# Patient Record
Sex: MALE | Employment: FULL TIME | URBAN - METROPOLITAN AREA
[De-identification: names, ages, dates, MRNs, and addresses within clinical notes are randomized per-mention and may not be internally consistent; named-entity substitution may affect disease eponyms.]

---

## 2020-06-13 DIAGNOSIS — Z20.822 SUSPECTED COVID-19 VIRUS INFECTION: ICD-10-CM

## 2022-09-20 ENCOUNTER — HOSPITAL ENCOUNTER (EMERGENCY)
Facility: HOSPITAL | Age: 37
Discharge: HOME | End: 2022-09-20
Attending: EMERGENCY MEDICINE

## 2022-09-20 VITALS
HEIGHT: 70 IN | RESPIRATION RATE: 18 BRPM | SYSTOLIC BLOOD PRESSURE: 144 MMHG | WEIGHT: 177 LBS | HEART RATE: 71 BPM | BODY MASS INDEX: 25.34 KG/M2 | OXYGEN SATURATION: 98 % | DIASTOLIC BLOOD PRESSURE: 78 MMHG | TEMPERATURE: 99 F

## 2022-09-20 DIAGNOSIS — J36 PERITONSILLAR ABSCESS: Primary | ICD-10-CM

## 2022-09-20 LAB
ALBUMIN SERPL-MCNC: 4.4 GM/DL (ref 3.5–5)
ALBUMIN/GLOB SERPL: 1.1 RATIO (ref 1.1–2)
ALP SERPL-CCNC: 88 UNIT/L (ref 40–150)
ALT SERPL-CCNC: 22 UNIT/L (ref 0–55)
AST SERPL-CCNC: 21 UNIT/L (ref 5–34)
BASOPHILS # BLD AUTO: 0.08 X10(3)/MCL (ref 0–0.2)
BASOPHILS NFR BLD AUTO: 0.7 %
BILIRUBIN DIRECT+TOT PNL SERPL-MCNC: 0.5 MG/DL
BUN SERPL-MCNC: 15.3 MG/DL (ref 8.9–20.6)
CALCIUM SERPL-MCNC: 9.7 MG/DL (ref 8.4–10.2)
CHLORIDE SERPL-SCNC: 105 MMOL/L (ref 98–107)
CO2 SERPL-SCNC: 27 MMOL/L (ref 22–29)
CREAT SERPL-MCNC: 0.94 MG/DL (ref 0.73–1.18)
EOSINOPHIL # BLD AUTO: 0.21 X10(3)/MCL (ref 0–0.9)
EOSINOPHIL NFR BLD AUTO: 1.8 %
ERYTHROCYTE [DISTWIDTH] IN BLOOD BY AUTOMATED COUNT: 12.9 % (ref 11.5–17)
GFR SERPLBLD CREATININE-BSD FMLA CKD-EPI: >60 MLS/MIN/1.73/M2
GLOBULIN SER-MCNC: 3.9 GM/DL (ref 2.4–3.5)
GLUCOSE SERPL-MCNC: 97 MG/DL (ref 74–100)
HCT VFR BLD AUTO: 44.2 % (ref 42–52)
HGB BLD-MCNC: 14.7 GM/DL (ref 14–18)
IMM GRANULOCYTES # BLD AUTO: 0.03 X10(3)/MCL (ref 0–0.04)
IMM GRANULOCYTES NFR BLD AUTO: 0.3 %
LYMPHOCYTES # BLD AUTO: 3.58 X10(3)/MCL (ref 0.6–4.6)
LYMPHOCYTES NFR BLD AUTO: 30 %
MCH RBC QN AUTO: 29.1 PG (ref 27–31)
MCHC RBC AUTO-ENTMCNC: 33.3 MG/DL (ref 33–36)
MCV RBC AUTO: 87.4 FL (ref 80–94)
MONOCYTES # BLD AUTO: 0.97 X10(3)/MCL (ref 0.1–1.3)
MONOCYTES NFR BLD AUTO: 8.1 %
NEUTROPHILS # BLD AUTO: 7.1 X10(3)/MCL (ref 2.1–9.2)
NEUTROPHILS NFR BLD AUTO: 59.1 %
NRBC BLD AUTO-RTO: 0 %
PLATELET # BLD AUTO: 331 X10(3)/MCL (ref 130–400)
PMV BLD AUTO: 8.3 FL (ref 7.4–10.4)
POTASSIUM SERPL-SCNC: 3.6 MMOL/L (ref 3.5–5.1)
PROT SERPL-MCNC: 8.3 GM/DL (ref 6.4–8.3)
RBC # BLD AUTO: 5.06 X10(6)/MCL (ref 4.7–6.1)
SODIUM SERPL-SCNC: 140 MMOL/L (ref 136–145)
STREP A PCR (OHS): NOT DETECTED
WBC # SPEC AUTO: 12 X10(3)/MCL (ref 4.5–11.5)

## 2022-09-20 PROCEDURE — 87631 RESP VIRUS 3-5 TARGETS: CPT | Performed by: PHYSICIAN ASSISTANT

## 2022-09-20 PROCEDURE — 96365 THER/PROPH/DIAG IV INF INIT: CPT

## 2022-09-20 PROCEDURE — 85025 COMPLETE CBC W/AUTO DIFF WBC: CPT | Performed by: PHYSICIAN ASSISTANT

## 2022-09-20 PROCEDURE — 99285 EMERGENCY DEPT VISIT HI MDM: CPT | Mod: 25

## 2022-09-20 PROCEDURE — 96375 TX/PRO/DX INJ NEW DRUG ADDON: CPT

## 2022-09-20 PROCEDURE — 36415 COLL VENOUS BLD VENIPUNCTURE: CPT | Performed by: PHYSICIAN ASSISTANT

## 2022-09-20 PROCEDURE — 63600175 PHARM REV CODE 636 W HCPCS: Performed by: PHYSICIAN ASSISTANT

## 2022-09-20 PROCEDURE — 80053 COMPREHEN METABOLIC PANEL: CPT | Performed by: PHYSICIAN ASSISTANT

## 2022-09-20 PROCEDURE — 25000003 PHARM REV CODE 250: Performed by: EMERGENCY MEDICINE

## 2022-09-20 PROCEDURE — 63600175 PHARM REV CODE 636 W HCPCS: Performed by: EMERGENCY MEDICINE

## 2022-09-20 PROCEDURE — 25500020 PHARM REV CODE 255: Performed by: EMERGENCY MEDICINE

## 2022-09-20 RX ORDER — DEXAMETHASONE SODIUM PHOSPHATE 4 MG/ML
10 INJECTION, SOLUTION INTRA-ARTICULAR; INTRALESIONAL; INTRAMUSCULAR; INTRAVENOUS; SOFT TISSUE
Status: COMPLETED | OUTPATIENT
Start: 2022-09-20 | End: 2022-09-20

## 2022-09-20 RX ORDER — DEXAMETHASONE 4 MG/1
4 TABLET ORAL EVERY 12 HOURS
Qty: 6 TABLET | Refills: 0 | Status: SHIPPED | OUTPATIENT
Start: 2022-09-20 | End: 2022-09-20 | Stop reason: ALTCHOICE

## 2022-09-20 RX ORDER — CLINDAMYCIN HYDROCHLORIDE 150 MG/1
450 CAPSULE ORAL EVERY 8 HOURS
Qty: 63 CAPSULE | Refills: 0 | Status: SHIPPED | OUTPATIENT
Start: 2022-09-20 | End: 2022-09-20 | Stop reason: SDUPTHER

## 2022-09-20 RX ORDER — KETOROLAC TROMETHAMINE 30 MG/ML
15 INJECTION, SOLUTION INTRAMUSCULAR; INTRAVENOUS
Status: COMPLETED | OUTPATIENT
Start: 2022-09-20 | End: 2022-09-20

## 2022-09-20 RX ORDER — CLINDAMYCIN HYDROCHLORIDE 150 MG/1
450 CAPSULE ORAL EVERY 8 HOURS
Qty: 63 CAPSULE | Refills: 0 | Status: SHIPPED | OUTPATIENT
Start: 2022-09-20 | End: 2022-09-27

## 2022-09-20 RX ADMIN — KETOROLAC TROMETHAMINE 15 MG: 30 INJECTION, SOLUTION INTRAMUSCULAR; INTRAVENOUS at 12:09

## 2022-09-20 RX ADMIN — DEXAMETHASONE SODIUM PHOSPHATE 10 MG: 4 INJECTION, SOLUTION INTRA-ARTICULAR; INTRALESIONAL; INTRAMUSCULAR; INTRAVENOUS; SOFT TISSUE at 01:09

## 2022-09-20 RX ADMIN — AMPICILLIN SODIUM AND SULBACTAM SODIUM 3 G: 2; 1 INJECTION, POWDER, FOR SOLUTION INTRAMUSCULAR; INTRAVENOUS at 03:09

## 2022-09-20 RX ADMIN — IOPAMIDOL 100 ML: 755 INJECTION, SOLUTION INTRAVENOUS at 01:09

## 2022-09-20 NOTE — ED PROVIDER NOTES
Encounter Date: 9/20/2022    SCRIBE #1 NOTE: I, Amber Whitney, am scribing for, and in the presence of,  Sandra Smith MD. I have scribed the following portions of the note - Other sections scribed: HPI, PE,ROS.     History     Chief Complaint   Patient presents with    Oral Swelling     Pt was brought in by helicopter for eval of throat pain and tonsil swelling. States has been on amoxicillin since 9/18. Difficulty speaking, needs eval for possible retropharyngeal abscess. Denies fever or other symptoms.     37 year old male presents to ED, via [place] complaining of throat pain.  Pt repots that he has been on amoxicillin for the last 4 days, but is still having throat pain and trouble swallowing.  Pt denies any other complaints.      Sore Throat   This is a new problem. The sore throat symptoms include difficulty swallowing and sore throat.The current episode started several days ago. The problem has been unchanged. Associated symptoms include trouble swallowing. Pertinent negatives include no abdominal pain, coughing, diarrhea, headaches, shortness of breath or vomiting.   Review of patient's allergies indicates:  No Known Allergies  History reviewed. No pertinent past medical history.  History reviewed. No pertinent surgical history.  History reviewed. No pertinent family history.     Review of Systems   Constitutional:  Negative for chills, diaphoresis and fever.   HENT:  Positive for sore throat and trouble swallowing.    Eyes:  Negative for visual disturbance.   Respiratory:  Negative for cough and shortness of breath.    Cardiovascular:  Negative for chest pain and palpitations.   Gastrointestinal:  Negative for abdominal pain, constipation, diarrhea, nausea and vomiting.   Genitourinary:  Negative for dysuria and hematuria.   Skin:  Negative for rash.   Neurological:  Negative for weakness, numbness and headaches.   All other systems reviewed and are negative.    Physical Exam     Initial  Vitals [09/20/22 1139]   BP Pulse Resp Temp SpO2   (!) 168/108 71 20 98.6 °F (37 °C) 96 %      MAP       --         Physical Exam    Nursing note and vitals reviewed.  Constitutional: No distress.   HENT:   Head: Normocephalic and atraumatic.   Mouth/Throat: Posterior oropharyngeal erythema present.   Hoarse voice   Eyes: Conjunctivae are normal.   Neck: Neck supple. No tracheal deviation present.   Submandibular tenderness, R > L   Normal range of motion.  Cardiovascular:  Normal rate and regular rhythm.           No murmur heard.  Pulmonary/Chest: Breath sounds normal. No stridor. No respiratory distress. He exhibits no tenderness.   Abdominal: Abdomen is soft. Bowel sounds are normal. He exhibits no distension. There is no abdominal tenderness.   Musculoskeletal:         General: Normal range of motion.      Cervical back: Normal range of motion and neck supple.      Lumbar back: Normal. No tenderness. Normal range of motion.     Neurological: He is alert and oriented to person, place, and time. He has normal strength. No cranial nerve deficit or sensory deficit.   Skin: Skin is warm and dry.       ED Course   Procedures  Labs Reviewed   COMPREHENSIVE METABOLIC PANEL - Abnormal; Notable for the following components:       Result Value    Globulin 3.9 (*)     All other components within normal limits   CBC WITH DIFFERENTIAL - Abnormal; Notable for the following components:    WBC 12.0 (*)     All other components within normal limits   STREP GROUP A BY PCR - Normal   CBC W/ AUTO DIFFERENTIAL    Narrative:     The following orders were created for panel order CBC auto differential.  Procedure                               Abnormality         Status                     ---------                               -----------         ------                     CBC with Differential[[id number]]        Abnormal            Final result                 Please view results for these tests on the individual orders.           Imaging Results              CT Soft Tissue Neck With Contrast (Final result)  Result time 09/20/22 13:42:49      Final result by Alejandro Nye MD (09/20/22 13:42:49)                   Impression:      Findings consistent with peritonsillar abscess on the left side with tonsillitis seen bilaterally    These findings were relayed to Nilsa PICKERING at the time of dictation      Electronically signed by: Alejandro Nye  Date:    09/20/2022  Time:    13:42               Narrative:    EXAMINATION:  CT SOFT TISSUE NECK WITH CONTRAST    CLINICAL HISTORY:  Neck abscess, deep tissue;    TECHNIQUE:  Low dose axial images as well as sagittal and coronal reconstructions were performed from the skull base to the clavicles following the intravenous administration of 100 mL of Isovue 370.  Automatic exposure control (AEC) is utilized to reduce patient radiation exposure.    COMPARISON:  None    FINDINGS:  Visualized portions of the orbits appear normal.  Paranasal sinuses appear normal.  The parotid glands appear normal.  Submandibular glands appear normal.  There is a peritonsillar abscess seen on the left side.  It measures 1 cm x 1.2 x 1 cm.  Findings are consistent with a peritonsillar abscess on the left.  There is also inflammatory changes in the peritonsillar region on the right but no abscess is seen on the right.  Epiglottis appears normal.  Subglottic region appears normal.  Visualized portion of the larynx appears normal.  Trachea is midline.  Thyroid appears normal.                                       Medications   ketorolac injection 15 mg (15 mg Intravenous Given 9/20/22 1218)   dexamethasone injection 10 mg (10 mg Intravenous Given 9/20/22 1330)   iopamidoL (ISOVUE-370) injection 100 mL (100 mLs Intravenous Given 9/20/22 1334)   ampicillin-sulbactam (UNASYN) 3 g in sodium chloride 0.9 % 100 mL IVPB (MB+) (0 g Intravenous Stopped 9/20/22 1615)     Medical Decision Making:   Initial Assessment:     Damion was brought in for evaluation of throat pain, worsening despite oral antibiotics.  Some submandibular tenderness and fullness, change in voice but no respiratory distress.  Will obtain labs, CT scan to evaluate for possible parapharyngeal abscess.  Differential Diagnosis:   Peritonsillar abscess, retropharyngeal abscess, pharyngitis  Clinical Tests:   Lab Tests: Ordered and Reviewed  Radiological Study: Ordered and Reviewed  ED Management:  Labs unremarkable.  CT scan with small peritonsillar abscess. Case discussed with ENT on call who evaluated the patient at the bedside. I&D performed. Will Steroids given in ED and he is feeling improved. Will DC home w/ abx. ED return precautions reviewed at the bedside and provided in the written discharge instructions. All questions answered to the best of my ability. He should follow up with ENT - contact information provided on discharge.             Scribe Attestation:   Scribe #1: I performed the above scribed service and the documentation accurately describes the services I performed. I attest to the accuracy of the note.    Attending Attestation:           Physician Attestation for Scribe:  Physician Attestation Statement for Scribe #1: I, Sandra Smith MD, reviewed documentation, as scribed by Amber Whitney in my presence, and it is both accurate and complete.           ED Course as of 10/03/22 0616   Tue Sep 20, 2022   1410 ENT paged to discuss CT findings of small peritonsillar abscess [KS]   1508 Called by ENT's nurse Sandra, requests ENT cart to bedside. States Dr. Fonseca is in clinic until 5pm. [KS]      ED Course User Index  [KS] Sandra Smith MD                   Clinical Impression:   Final diagnoses:  [J36] Peritonsillar abscess (Primary)      ED Disposition Condition    Discharge           ED Prescriptions       Medication Sig Dispense Start Date End Date Auth. Provider                      clindamycin (CLEOCIN) 150 MG capsule Take 3 capsules  (450 mg total) by mouth every 8 (eight) hours. for 7 days 63 capsule 9/20/2022 9/27/2022 Sandra Smith MD          Follow-up Information       Follow up With Specialties Details Why Contact Info    Reji Fonseca MD Otolaryngology Schedule an appointment as soon as possible for a visit in 1 week  [address] [facility] [address]  [phone]      [facility] - Emergency Dept Emergency Medicine  As needed, If symptoms worsen [facility] [facility] [id number] [phone]             Sandra Smith MD  10/03/22 0619

## 2022-09-20 NOTE — FIRST PROVIDER EVALUATION
"Medical screening examination initiated.  I have conducted a focused provider triage encounter, findings are as follows:    Brief history of present illness:  36 yo male presents to ED for worsening sore throat and swelling. Patient reports pain starting 4 days ago. [place]. Sent to ED for evaluation of possible abscess.    Vitals:    09/20/22 1139   BP: (!) 168/108   BP Location: Left arm   Patient Position: Sitting   Pulse: 71   Resp: 20   Temp: 98.6 °F (37 °C)   TempSrc: Oral   SpO2: 96%   Weight: 177 lb (80.3 kg)   Height: 5' 10" (1.778 m)       Pertinent physical exam:  Awake, alert and oriented. Mild erythema and tonsillar swelling noted.    Brief workup plan:  labs, Strep, ct soft tissue    Preliminary workup initiated; this workup will be continued and followed by the physician or advanced practice provider that is assigned to the patient when roomed.  " stated

## 2022-09-21 NOTE — OP NOTE
[facility]                       [address] [place] [id number]    PATIENT NAME:      ILIA AREVALO   YOB: 1985  CSN:               [id number]  MRN:               [id number]  ADMIT DATE:        09/20/2022 11:43:00  PHYSICIAN:         Reji Fonseca MD                          OPERATIVE REPORT      DATE OF SURGERY:    09/20/2022 00:00:00    SURGEON:  Reji Fonseca MD    PREOPERATIVE DIAGNOSIS:  Left peritonsillar abscess.    POSTOPERATIVE DIAGNOSIS:  Left peritonsillar abscess.    PROCEDURE PERFORMED:  Incision and drainage of left peritonsillar abscess.    FINDINGS:  Approximately 2 mL of purulent fluid, left peritonsillar abscess.    ANESTHESIA:  Local.    BLOOD LOSS:  Minimal.    COMPLICATIONS:  None.    DRAINS:  None.    DRESSING:  None.    DESCRIPTION OF PROCEDURE:  After informed consent was obtained, the left   peritonsillar region was sprayed with topical Hurricaine spray and then injected   with 2 mL of 1% lidocaine.  After sufficient time was allowed for the   medication to take effect, a #15 blade was then used to make an incision in the   superolateral quadrant of the left tonsillar pillar overlying the area of most   swelling.  The mucosa was incised and then a tonsil stat was then used to spread   in the peritonsillar space.  Approximately 1 mL of purulence was expressed.    The patient felt better immediately.  The patient was then allowed to gargle and   spit approximately 1 L of ice water.  Bleeding was minimal.  The patient   tolerated the procedure well.        ______________________________  Reji Fonseca MD    JWA/AQS  DD:  09/21/2022  Time:  07:27AM  DT:  09/21/2022  Time:  08:11AM  Job #:  [id number]      OPERATIVE REPORT

## 2022-09-21 NOTE — CONSULTS
[facility]                       [address] [place] [id number]    PATIENT NAME:       ILIA AREVALO   YOB: 1985  CSN:                [id number]   MRN:                [id number]  ADMIT DATE:         09/20/2022 11:43:00  PHYSICIAN:          Reji Fonseca MD                            CONSULTATION    DATE OF CONSULT:  09/20/2022 00:00:00    CHIEF COMPLAINT:  Peritonsillar abscess.    HISTORY OF PRESENT ILLNESS:  This is a 37-year-old male with a several-day   history of a worsening sore throat.  He was treated with antibiotics, however,   symptoms persisted and worsened.  He presented to the emergency department and a   CT scan was ordered which showed an approximately 1 cm left-sided peritonsillar   abscess.  ENT was consulted.    PAST SURGICAL HISTORY:  None.    PAST MEDICAL HISTORY:  None.    SOCIAL HISTORY:  Denies drinking, smoking, or illicit drug use.  Works offshore.    FAMILY HISTORY:  Noncontributory.    MEDICATIONS:  None.    DRUG ALLERGIES:  No known drug allergies.    BODY AFTER ALLERGIES:      REVIEW OF SYSTEMS:  As above.    PHYSICAL EXAMINATION:  GENERAL:  No distress.  Follows commands.    EARS:  Clear.  NOSE:  Clear.  NECK:  Supple.  No lymphadenopathy, masses, induration, or fluctuance.  ORAL CAVITY/OROPHARYNX:  Left peritonsillar fullness.  Erythema and edema   consistent with peritonsillar abscess.    LABS/STUDIES:  Reviewed.    ASSESSMENT/PLAN:  Left peritonsillar abscess noted.  Incision and drainage   performed.  See op note for details.  Recommend clindamycin for 10 days and   follow up as needed as an outpatient.        ______________________________  MD CARLOTA Ferrera/MAYANK  DD:  09/21/2022  Time:  07:25AM  DT:  09/21/2022  Time:  07:56AM  Job #:  [id number]      CONSULTATION